# Patient Record
Sex: MALE | Race: WHITE | NOT HISPANIC OR LATINO | URBAN - METROPOLITAN AREA
[De-identification: names, ages, dates, MRNs, and addresses within clinical notes are randomized per-mention and may not be internally consistent; named-entity substitution may affect disease eponyms.]

---

## 2020-09-21 ENCOUNTER — OFFICE VISIT (OUTPATIENT)
Dept: URBAN - METROPOLITAN AREA CLINIC 118 | Facility: CLINIC | Age: 35
End: 2020-09-21

## 2020-09-25 ENCOUNTER — TELEPHONE ENCOUNTER (OUTPATIENT)
Dept: URBAN - METROPOLITAN AREA CLINIC 92 | Facility: CLINIC | Age: 35
End: 2020-09-25

## 2020-10-05 ENCOUNTER — ERX REFILL RESPONSE (OUTPATIENT)
Dept: URBAN - METROPOLITAN AREA CLINIC 13 | Facility: CLINIC | Age: 35
End: 2020-10-05

## 2020-10-05 RX ORDER — AZATHIOPRINE 50 1/1
TAKE 1 TABLET BY MOUTH ONCE DAILY FOR 90 DAYS TABLET ORAL
Qty: 90 | Refills: 1

## 2020-10-12 ENCOUNTER — OFFICE VISIT (OUTPATIENT)
Dept: URBAN - METROPOLITAN AREA TELEHEALTH 2 | Facility: TELEHEALTH | Age: 35
End: 2020-10-12
Payer: COMMERCIAL

## 2020-10-12 DIAGNOSIS — K50.00 CROHN'S DISEASE OF SMALL INTESTINE WITHOUT COMPLICATION: ICD-10-CM

## 2020-10-12 PROCEDURE — G8482 FLU IMMUNIZE ORDER/ADMIN: HCPCS | Performed by: INTERNAL MEDICINE

## 2020-10-12 PROCEDURE — 99213 OFFICE O/P EST LOW 20 MIN: CPT | Performed by: INTERNAL MEDICINE

## 2020-10-12 PROCEDURE — 1036F TOBACCO NON-USER: CPT | Performed by: INTERNAL MEDICINE

## 2020-10-12 PROCEDURE — G8427 DOCREV CUR MEDS BY ELIG CLIN: HCPCS | Performed by: INTERNAL MEDICINE

## 2020-10-12 PROCEDURE — G9903 PT SCRN TBCO ID AS NON USER: HCPCS | Performed by: INTERNAL MEDICINE

## 2020-10-12 PROCEDURE — G8417 CALC BMI ABV UP PARAM F/U: HCPCS | Performed by: INTERNAL MEDICINE

## 2020-10-12 RX ORDER — AZATHIOPRINE 50 1/1
TAKE 1 TABLET BY MOUTH ONCE DAILY FOR 90 DAYS TABLET ORAL
Qty: 90 | Refills: 1 | Status: ACTIVE | COMMUNITY

## 2020-10-12 NOTE — HPI-TODAY'S VISIT:
Pt doing well.  BMs ~2s/d, oatmeal consistency, no change, no  GI bleed.  Pt on Imuran, 50 mg/d.  No other meds.  No abd pain, N/V, weight loss.  Had COVID 2 months ago, relatively asymptomatic.

## 2021-09-10 ENCOUNTER — TELEPHONE ENCOUNTER (OUTPATIENT)
Dept: URBAN - METROPOLITAN AREA CLINIC 117 | Facility: CLINIC | Age: 36
End: 2021-09-10

## 2021-09-10 RX ORDER — AZATHIOPRINE 50 1/1
TAKE 1 TABLET BY MOUTH ONCE DAILY FOR 90 DAYS TABLET ORAL DAILY
Qty: 90 | Refills: 0

## 2021-11-12 ENCOUNTER — LAB OUTSIDE AN ENCOUNTER (OUTPATIENT)
Dept: URBAN - METROPOLITAN AREA CLINIC 118 | Facility: CLINIC | Age: 36
End: 2021-11-12

## 2021-11-12 ENCOUNTER — OFFICE VISIT (OUTPATIENT)
Dept: URBAN - METROPOLITAN AREA CLINIC 118 | Facility: CLINIC | Age: 36
End: 2021-11-12
Payer: COMMERCIAL

## 2021-11-12 DIAGNOSIS — K50.00 CROHN'S DISEASE OF SMALL INTESTINE WITHOUT COMPLICATION: ICD-10-CM

## 2021-11-12 DIAGNOSIS — R12 HEARTBURN: ICD-10-CM

## 2021-11-12 PROCEDURE — 99213 OFFICE O/P EST LOW 20 MIN: CPT | Performed by: INTERNAL MEDICINE

## 2021-11-12 RX ORDER — AZATHIOPRINE 50 1/1
TAKE 1 TABLET BY MOUTH ONCE DAILY FOR 90 DAYS TABLET ORAL DAILY
Qty: 90 | Refills: 0 | Status: ACTIVE | COMMUNITY

## 2021-11-12 NOTE — HPI-TODAY'S VISIT:
Pt here for routine Crohn's follow up.   Doing well on Imuran.  BMs regular, 2-3x/d, soft, formed, occ oatmeal.  No GI bleed.  No abd pain, N/V, weight loss.

## 2021-11-13 LAB
A/G RATIO: 1.8
ALBUMIN: 4.7
ALKALINE PHOSPHATASE: 84
ALT (SGPT): 60
AST (SGOT): 49
BASO (ABSOLUTE): 0.1
BASOS: 1
BILIRUBIN, TOTAL: 0.7
BUN/CREATININE RATIO: 10
BUN: 10
CALCIUM: 9.4
CARBON DIOXIDE, TOTAL: 24
CHLORIDE: 104
CREATININE: 1.02
EGFR IF AFRICN AM: 110
EGFR IF NONAFRICN AM: 95
EOS (ABSOLUTE): 0.3
EOS: 5
GLOBULIN, TOTAL: 2.6
GLUCOSE: 102
HEMATOCRIT: 46.1
HEMATOLOGY COMMENTS:: (no result)
HEMOGLOBIN: 15.2
IMMATURE CELLS: (no result)
IMMATURE GRANS (ABS): 0.1
IMMATURE GRANULOCYTES: 1
LYMPHS (ABSOLUTE): 1.9
LYMPHS: 29
MCH: 30.1
MCHC: 33
MCV: 91
MONOCYTES(ABSOLUTE): 0.7
MONOCYTES: 10
NEUTROPHILS (ABSOLUTE): 3.6
NEUTROPHILS: 54
NRBC: (no result)
PLATELETS: 240
POTASSIUM: 4.1
PROTEIN, TOTAL: 7.3
RBC: 5.05
RDW: 13.3
SODIUM: 143
VITAMIN B12: 163
WBC: 6.7

## 2021-11-21 ENCOUNTER — TELEPHONE ENCOUNTER (OUTPATIENT)
Dept: URBAN - METROPOLITAN AREA CLINIC 92 | Facility: CLINIC | Age: 36
End: 2021-11-21

## 2021-11-21 ENCOUNTER — LAB OUTSIDE AN ENCOUNTER (OUTPATIENT)
Dept: URBAN - METROPOLITAN AREA CLINIC 92 | Facility: CLINIC | Age: 36
End: 2021-11-21

## 2021-11-29 ENCOUNTER — CLAIMS CREATED FROM THE CLAIM WINDOW (OUTPATIENT)
Dept: URBAN - METROPOLITAN AREA CLINIC 117 | Facility: CLINIC | Age: 36
End: 2021-11-29
Payer: COMMERCIAL

## 2021-11-29 DIAGNOSIS — K76.0 FATTY (CHANGE OF) LIVER: ICD-10-CM

## 2021-11-29 PROCEDURE — 76705 ECHO EXAM OF ABDOMEN: CPT | Performed by: INTERNAL MEDICINE

## 2021-11-29 RX ORDER — AZATHIOPRINE 50 1/1
TAKE 1 TABLET BY MOUTH ONCE DAILY FOR 90 DAYS TABLET ORAL DAILY
Qty: 90 | Refills: 0 | Status: ACTIVE | COMMUNITY

## 2022-02-24 ENCOUNTER — TELEPHONE ENCOUNTER (OUTPATIENT)
Dept: URBAN - METROPOLITAN AREA CLINIC 118 | Facility: CLINIC | Age: 37
End: 2022-02-24

## 2022-02-24 RX ORDER — AZATHIOPRINE 50 1/1
TAKE 1 TABLET BY MOUTH ONCE DAILY FOR 90 DAYS TABLET ORAL DAILY
Qty: 90 | Refills: 2

## 2022-04-12 ENCOUNTER — LAB OUTSIDE AN ENCOUNTER (OUTPATIENT)
Dept: URBAN - METROPOLITAN AREA CLINIC 118 | Facility: CLINIC | Age: 37
End: 2022-04-12

## 2022-04-13 LAB
A/G RATIO: 2.2
ALBUMIN: 4.8
ALKALINE PHOSPHATASE: 78
ALT (SGPT): 53
AST (SGOT): 51
BASO (ABSOLUTE): 0
BASOS: 1
BILIRUBIN, TOTAL: 1
BUN/CREATININE RATIO: 9
BUN: 10
CALCIUM: 9.7
CARBON DIOXIDE, TOTAL: 21
CHLORIDE: 103
CREATININE: 1.13
EGFR: 87
EOS (ABSOLUTE): 0.1
EOS: 2
GLOBULIN, TOTAL: 2.2
GLUCOSE: 86
HEMATOCRIT: 42
HEMATOLOGY COMMENTS:: (no result)
HEMOGLOBIN: 13.8
IMMATURE CELLS: (no result)
IMMATURE GRANS (ABS): 0
IMMATURE GRANULOCYTES: 1
LYMPHS (ABSOLUTE): 2.1
LYMPHS: 29
MCH: 29.6
MCHC: 32.9
MCV: 90
MONOCYTES(ABSOLUTE): 0.7
MONOCYTES: 10
NEUTROPHILS (ABSOLUTE): 4.2
NEUTROPHILS: 57
NRBC: (no result)
PLATELETS: 217
POTASSIUM: 3.9
PROTEIN, TOTAL: 7
RBC: 4.66
RDW: 13.4
SODIUM: 141
VITAMIN B12: 153
WBC: 7.1

## 2022-06-03 ENCOUNTER — OFFICE VISIT (OUTPATIENT)
Dept: URBAN - METROPOLITAN AREA CLINIC 118 | Facility: CLINIC | Age: 37
End: 2022-06-03
Payer: COMMERCIAL

## 2022-06-03 ENCOUNTER — LAB OUTSIDE AN ENCOUNTER (OUTPATIENT)
Dept: URBAN - METROPOLITAN AREA CLINIC 118 | Facility: CLINIC | Age: 37
End: 2022-06-03

## 2022-06-03 DIAGNOSIS — R12 HEARTBURN: ICD-10-CM

## 2022-06-03 DIAGNOSIS — E53.8 B12 DEFICIENCY: ICD-10-CM

## 2022-06-03 DIAGNOSIS — R74.01 TRANSAMINITIS: ICD-10-CM

## 2022-06-03 DIAGNOSIS — K50.00 CROHN'S DISEASE OF SMALL INTESTINE WITHOUT COMPLICATION: ICD-10-CM

## 2022-06-03 PROBLEM — 16331000: Status: ACTIVE | Noted: 2021-11-12

## 2022-06-03 PROCEDURE — 99213 OFFICE O/P EST LOW 20 MIN: CPT | Performed by: INTERNAL MEDICINE

## 2022-06-03 RX ORDER — AZATHIOPRINE 50 1/1
TAKE 1 TABLET BY MOUTH ONCE DAILY FOR 90 DAYS TABLET ORAL DAILY
Qty: 90 | Refills: 2 | Status: ACTIVE | COMMUNITY

## 2022-06-03 NOTE — HPI-TODAY'S VISIT:
6/3/22 - Pt here for follow up.  Doing well on Imuran.  BMs 2-3x/d, soft, no GI bleed.  No abd pain, N/V.  Has gained 28# since last visit, but has maintained activity.  LFTs reviewed, and U/S showed fatty liver. *********************** 11/21/21 - Pt here for routine Crohn's follow up.   Doing well on Imuran.  BMs regular, 2-3x/d, soft, formed, occ oatmeal.  No GI bleed.  No abd pain, N/V, weight loss.

## 2022-06-04 LAB
HBSAG SCREEN: NEGATIVE
HCV AB: 0.1
INTERPRETATION:: (no result)

## 2022-07-11 ENCOUNTER — OFFICE VISIT (OUTPATIENT)
Dept: URBAN - METROPOLITAN AREA SURGERY CENTER 23 | Facility: SURGERY CENTER | Age: 37
End: 2022-07-11
Payer: COMMERCIAL

## 2022-07-11 DIAGNOSIS — K50.80 CROHN'S COLITIS: ICD-10-CM

## 2022-07-11 PROCEDURE — 45380 COLONOSCOPY AND BIOPSY: CPT | Performed by: INTERNAL MEDICINE

## 2022-07-11 PROCEDURE — G8907 PT DOC NO EVENTS ON DISCHARG: HCPCS | Performed by: INTERNAL MEDICINE

## 2022-07-11 RX ORDER — AZATHIOPRINE 50 1/1
TAKE 1 TABLET BY MOUTH ONCE DAILY FOR 90 DAYS TABLET ORAL DAILY
Qty: 90 | Refills: 2 | Status: ACTIVE | COMMUNITY

## 2023-01-03 ENCOUNTER — OFFICE VISIT (OUTPATIENT)
Dept: URBAN - METROPOLITAN AREA CLINIC 118 | Facility: CLINIC | Age: 38
End: 2023-01-03
Payer: COMMERCIAL

## 2023-01-03 ENCOUNTER — DASHBOARD ENCOUNTERS (OUTPATIENT)
Age: 38
End: 2023-01-03

## 2023-01-03 VITALS
HEART RATE: 85 BPM | WEIGHT: 296 LBS | DIASTOLIC BLOOD PRESSURE: 78 MMHG | TEMPERATURE: 97.3 F | SYSTOLIC BLOOD PRESSURE: 128 MMHG | HEIGHT: 71 IN | BODY MASS INDEX: 41.44 KG/M2

## 2023-01-03 DIAGNOSIS — E53.8 B12 DEFICIENCY: ICD-10-CM

## 2023-01-03 DIAGNOSIS — R74.01 TRANSAMINITIS: ICD-10-CM

## 2023-01-03 DIAGNOSIS — K50.00 CROHN'S DISEASE OF SMALL INTESTINE WITHOUT COMPLICATION: ICD-10-CM

## 2023-01-03 PROBLEM — 365767001: Status: ACTIVE | Noted: 2021-11-21

## 2023-01-03 PROBLEM — 190634004: Status: ACTIVE | Noted: 2021-11-21

## 2023-01-03 PROCEDURE — 99213 OFFICE O/P EST LOW 20 MIN: CPT | Performed by: INTERNAL MEDICINE

## 2023-01-03 RX ORDER — AZATHIOPRINE 50 1/1
TAKE 1 TABLET BY MOUTH ONCE DAILY FOR 90 DAYS TABLET ORAL DAILY
Qty: 90 | Refills: 2 | Status: ACTIVE | COMMUNITY

## 2023-01-03 RX ORDER — CYANOCOBALAMIN (VITAMIN B-12) 5000 MCG
AS DIRECTED TABLET,DISINTEGRATING ORAL
Status: ACTIVE | COMMUNITY

## 2023-01-04 LAB
ABSOLUTE BASOPHILS: 30
ABSOLUTE EOSINOPHILS: 173
ABSOLUTE LYMPHOCYTES: 2573
ABSOLUTE MONOCYTES: 533
ABSOLUTE NEUTROPHILS: 4193
ALBUMIN/GLOBULIN RATIO: 1.7
ALBUMIN: 4.7
ALKALINE PHOSPHATASE: 81
ALT: 56
AST: 34
BASOPHILS: 0.4
BILIRUBIN, DIRECT: 0.1
BILIRUBIN, INDIRECT: 0.6
BILIRUBIN, TOTAL: 0.7
EOSINOPHILS: 2.3
FIB 4 INDEX: 0.7
FIB 4 INTERPRETATION: (no result)
GLOBULIN: 2.7
HEMATOCRIT: 43.5
HEMOGLOBIN: 14.7
LYMPHOCYTES: 34.3
MCH: 29.2
MCHC: 33.8
MCV: 86.3
MONOCYTES: 7.1
MPV: 11.2
NEUTROPHILS: 55.9
PLATELET COUNT: 226
PLATELET COUNT: 226
PROTEIN, TOTAL: 7.4
RDW: 13.6
RED BLOOD CELL COUNT: 5.04
WHITE BLOOD CELL COUNT: 7.5

## 2023-06-01 ENCOUNTER — ERX REFILL RESPONSE (OUTPATIENT)
Dept: URBAN - METROPOLITAN AREA CLINIC 118 | Facility: CLINIC | Age: 38
End: 2023-06-01

## 2023-06-01 RX ORDER — AZATHIOPRINE 50 1/1
TAKE ONE TABLET BY MOUTH ONCE DAILY TABLET ORAL
Qty: 90 TABLET | Refills: 2 | OUTPATIENT

## 2023-06-01 RX ORDER — AZATHIOPRINE 50 1/1
TAKE 1 TABLET BY MOUTH ONCE DAILY FOR 90 DAYS TABLET ORAL DAILY
Qty: 90 | Refills: 2 | OUTPATIENT

## 2023-07-07 ENCOUNTER — OFFICE VISIT (OUTPATIENT)
Dept: URBAN - METROPOLITAN AREA CLINIC 118 | Facility: CLINIC | Age: 38
End: 2023-07-07

## 2023-07-14 ENCOUNTER — OFFICE VISIT (OUTPATIENT)
Dept: URBAN - METROPOLITAN AREA CLINIC 118 | Facility: CLINIC | Age: 38
End: 2023-07-14

## 2024-11-05 ENCOUNTER — TELEPHONE ENCOUNTER (OUTPATIENT)
Dept: URBAN - METROPOLITAN AREA CLINIC 118 | Facility: CLINIC | Age: 39
End: 2024-11-05

## 2024-11-05 RX ORDER — AZATHIOPRINE 50 1/1
TAKE ONE TABLET BY MOUTH ONCE DAILY TABLET ORAL
Qty: 30 | Refills: 0

## 2024-11-22 ENCOUNTER — OFFICE VISIT (OUTPATIENT)
Dept: URBAN - METROPOLITAN AREA CLINIC 118 | Facility: CLINIC | Age: 39
End: 2024-11-22

## 2024-12-18 ENCOUNTER — OFFICE VISIT (OUTPATIENT)
Dept: URBAN - METROPOLITAN AREA CLINIC 118 | Facility: CLINIC | Age: 39
End: 2024-12-18

## 2024-12-20 ENCOUNTER — OFFICE VISIT (OUTPATIENT)
Dept: URBAN - METROPOLITAN AREA CLINIC 118 | Facility: CLINIC | Age: 39
End: 2024-12-20
Payer: COMMERCIAL

## 2024-12-20 VITALS
SYSTOLIC BLOOD PRESSURE: 145 MMHG | TEMPERATURE: 97 F | HEIGHT: 70 IN | BODY MASS INDEX: 43.84 KG/M2 | DIASTOLIC BLOOD PRESSURE: 78 MMHG | WEIGHT: 306.2 LBS | HEART RATE: 101 BPM

## 2024-12-20 DIAGNOSIS — R74.01 TRANSAMINITIS: ICD-10-CM

## 2024-12-20 DIAGNOSIS — K50.00 CROHN'S DISEASE OF SMALL INTESTINE WITHOUT COMPLICATION: ICD-10-CM

## 2024-12-20 DIAGNOSIS — R12 HEARTBURN: ICD-10-CM

## 2024-12-20 DIAGNOSIS — E53.8 B12 DEFICIENCY: ICD-10-CM

## 2024-12-20 PROCEDURE — 99214 OFFICE O/P EST MOD 30 MIN: CPT | Performed by: INTERNAL MEDICINE

## 2024-12-20 RX ORDER — AZATHIOPRINE 50 1/1
TAKE ONE TABLET BY MOUTH ONCE DAILY TABLET ORAL DAILY
Qty: 90 | Refills: 3

## 2024-12-20 RX ORDER — AZATHIOPRINE 50 1/1
TAKE ONE TABLET BY MOUTH ONCE DAILY TABLET ORAL
Qty: 30 | Refills: 0 | Status: ACTIVE | COMMUNITY

## 2024-12-20 RX ORDER — CYANOCOBALAMIN (VITAMIN B-12) 5000 MCG
AS DIRECTED TABLET,DISINTEGRATING ORAL
Status: ACTIVE | COMMUNITY

## 2024-12-20 NOTE — HPI-TODAY'S VISIT:
12/20/24 - 38 yo WM here for follow up.  On Imuran, 50 mg/d.  Skipped last visits.  Pt getting labs with PCP, and had some done 2 wks ago.   Labs from 12/4/24 - CBC normal with plt = 225K.  CMP did not get done. BMs regular, 2x/d, no change, no GI bleed.  No abd pain, N/V, weight loss.   - - - - - - -- 1/3/23 - Pt here for follow up.  Colonoscopy on 7/11/22 was normal.  BMs 2x/d, soft, no GI bleed.  No abd pain, N/V.  Lost 2#.  On Imuran. *************************** 6/3/22 - Pt here for follow up.  Doing well on Imuran.  BMs 2-3x/d, soft, no GI bleed.  No abd pain, N/V.  Has gained 28# since last visit, but has maintained activity.  LFTs reviewed, and U/S showed fatty liver. *********************** 11/21/21 - Pt here for routine Crohn's follow up.   Doing well on Imuran.  BMs regular, 2-3x/d, soft, formed, occ oatmeal.  No GI bleed.  No abd pain, N/V, weight loss.

## 2024-12-22 LAB
ALBUMIN/GLOBULIN RATIO: 1.8
ALBUMIN: 4.6
ALKALINE PHOSPHATASE: 81
ALT: 46
AST: 32
BILIRUBIN, TOTAL: 0.9
BUN/CREATININE RATIO: (no result)
CALCIUM: 9.5
CARBON DIOXIDE: 28
CHLORIDE: 103
CREATININE: 0.96
EGFR: 104
FIB 4 INDEX: 0.75
FIB 4 INTERPRETATION: (no result)
FIB 4 SUMMARY: (no result)
GLOBULIN: 2.6
GLUCOSE: 132
PLATELET COUNT: 234
POTASSIUM: 4
PROTEIN, TOTAL: 7.2
SODIUM: 140
UREA NITROGEN (BUN): 11
VITAMIN B12: 374

## 2025-06-20 ENCOUNTER — OFFICE VISIT (OUTPATIENT)
Dept: URBAN - METROPOLITAN AREA CLINIC 118 | Facility: CLINIC | Age: 40
End: 2025-06-20

## 2025-06-25 ENCOUNTER — OFFICE VISIT (OUTPATIENT)
Dept: URBAN - METROPOLITAN AREA CLINIC 118 | Facility: CLINIC | Age: 40
End: 2025-06-25

## 2025-07-29 ENCOUNTER — OFFICE VISIT (OUTPATIENT)
Dept: URBAN - METROPOLITAN AREA CLINIC 118 | Facility: CLINIC | Age: 40
End: 2025-07-29
Payer: COMMERCIAL

## 2025-07-29 ENCOUNTER — LAB OUTSIDE AN ENCOUNTER (OUTPATIENT)
Dept: URBAN - METROPOLITAN AREA CLINIC 118 | Facility: CLINIC | Age: 40
End: 2025-07-29

## 2025-07-29 DIAGNOSIS — E53.8 B12 DEFICIENCY: ICD-10-CM

## 2025-07-29 DIAGNOSIS — K50.00 CROHN'S DISEASE OF SMALL INTESTINE WITHOUT COMPLICATION: ICD-10-CM

## 2025-07-29 PROCEDURE — 99213 OFFICE O/P EST LOW 20 MIN: CPT | Performed by: INTERNAL MEDICINE

## 2025-07-29 RX ORDER — AZATHIOPRINE 50 1/1
TAKE ONE TABLET BY MOUTH ONCE DAILY TABLET ORAL DAILY
Qty: 90 | Refills: 3 | Status: ACTIVE | COMMUNITY

## 2025-07-29 RX ORDER — CYANOCOBALAMIN (VITAMIN B-12) 5000 MCG
AS DIRECTED TABLET,DISINTEGRATING ORAL
Status: ACTIVE | COMMUNITY

## 2025-07-29 RX ORDER — TESTOSTERONE 10 MG/G
1 EACH TO SKIN IN THE MORNING TO SHOULDER, UPPER ARMS OR ABDOMEN GEL TOPICAL ONCE A DAY
Status: ACTIVE | COMMUNITY

## 2025-07-29 NOTE — HPI-TODAY'S VISIT:
7/29/25 - Pt here for follow up of Crohn's disease, on Imuran.  Doing well.  Labs last time were normal.  Weight stable.  BMs regular, 2-3x/d, no GI bleed.  No abd pain, N/V. ------------- 12/20/24 - 40 yo WM here for follow up.  On Imuran, 50 mg/d.  Skipped last visits.  Pt getting labs with PCP, and had some done 2 wks ago.   Labs from 12/4/24 - CBC normal with plt = 225K.  CMP did not get done. BMs regular, 2x/d, no change, no GI bleed.  No abd pain, N/V, weight loss.   - - - - - - -- 1/3/23 - Pt here for follow up.  Colonoscopy on 7/11/22 was normal.  BMs 2x/d, soft, no GI bleed.  No abd pain, N/V.  Lost 2#.  On Imuran. *************************** 6/3/22 - Pt here for follow up.  Doing well on Imuran.  BMs 2-3x/d, soft, no GI bleed.  No abd pain, N/V.  Has gained 28# since last visit, but has maintained activity.  LFTs reviewed, and U/S showed fatty liver. *********************** 11/21/21 - Pt here for routine Crohn's follow up.   Doing well on Imuran.  BMs regular, 2-3x/d, soft, formed, occ oatmeal.  No GI bleed.  No abd pain, N/V, weight loss.

## 2025-07-30 LAB
A/G RATIO: 1.9
ABSOLUTE BASOPHILS: 38
ABSOLUTE EOSINOPHILS: 319
ABSOLUTE LYMPHOCYTES: 2371
ABSOLUTE MONOCYTES: 547
ABSOLUTE NEUTROPHILS: 4324
ALBUMIN: 4.7
ALKALINE PHOSPHATASE: 76
ALT (SGPT): 53
AST (SGOT): 39
BASOPHILS: 0.5
BILIRUBIN, TOTAL: 0.9
BUN/CREATININE RATIO: (no result)
BUN: 10
C-REACTIVE PROTEIN, QUANT: 4.3
CALCIUM: 9.5
CARBON DIOXIDE, TOTAL: 28
CHLORIDE: 104
CREATININE: 1.11
EGFR: 88
EOSINOPHILS: 4.2
GLOBULIN, TOTAL: 2.5
GLUCOSE: 96
HEMATOCRIT: 44.6
HEMOGLOBIN: 14.6
LYMPHOCYTES: 31.2
MCH: 29.8
MCHC: 32.7
MCV: 91
MONOCYTES: 7.2
MPV: 10.8
NEUTROPHILS: 56.9
PLATELET COUNT: 226
POTASSIUM: 4.1
PROTEIN, TOTAL: 7.2
RDW: 13.4
RED BLOOD CELL COUNT: 4.9
SODIUM: 141
WHITE BLOOD CELL COUNT: 7.6

## 2025-08-29 ENCOUNTER — OFFICE VISIT (OUTPATIENT)
Dept: URBAN - METROPOLITAN AREA SURGERY CENTER 23 | Facility: SURGERY CENTER | Age: 40
End: 2025-08-29

## 2025-08-29 RX ORDER — AZATHIOPRINE 50 1/1
TAKE ONE TABLET BY MOUTH ONCE DAILY TABLET ORAL DAILY
Qty: 90 | Refills: 3 | Status: ACTIVE | COMMUNITY

## 2025-08-29 RX ORDER — TESTOSTERONE 10 MG/G
1 EACH TO SKIN IN THE MORNING TO SHOULDER, UPPER ARMS OR ABDOMEN GEL TOPICAL ONCE A DAY
Status: ACTIVE | COMMUNITY

## 2025-08-29 RX ORDER — CYANOCOBALAMIN (VITAMIN B-12) 5000 MCG
AS DIRECTED TABLET,DISINTEGRATING ORAL
Status: ACTIVE | COMMUNITY